# Patient Record
Sex: FEMALE | Race: ASIAN | Employment: UNEMPLOYED | URBAN - METROPOLITAN AREA
[De-identification: names, ages, dates, MRNs, and addresses within clinical notes are randomized per-mention and may not be internally consistent; named-entity substitution may affect disease eponyms.]

---

## 2019-04-11 ENCOUNTER — THERAPY VISIT (OUTPATIENT)
Dept: PHYSICAL THERAPY | Facility: CLINIC | Age: 54
End: 2019-04-11
Payer: COMMERCIAL

## 2019-04-11 DIAGNOSIS — M54.16 LUMBAR RADICULITIS: ICD-10-CM

## 2019-04-11 PROCEDURE — 97110 THERAPEUTIC EXERCISES: CPT | Mod: GP | Performed by: PHYSICAL THERAPIST

## 2019-04-11 PROCEDURE — 97161 PT EVAL LOW COMPLEX 20 MIN: CPT | Mod: GP | Performed by: PHYSICAL THERAPIST

## 2019-04-11 PROCEDURE — 97112 NEUROMUSCULAR REEDUCATION: CPT | Mod: GP | Performed by: PHYSICAL THERAPIST

## 2019-04-11 NOTE — PROGRESS NOTES
[place] [facility] Initial Evaluation  Subjective:            MD: 4/1/2019..                          General health as reported by patient is fair.  Pertinent medical history includes:  Diabetes.      Current medications:  High blood pressure medication.                    Oswestry Score: 10 %                 Objective:  System    Physical Exam    General     ROS    Assessment/Plan:

## 2019-04-11 NOTE — LETTER
DEPARTMENT OF HEALTH AND HUMAN SERVICES  CENTERS FOR MEDICARE & MEDICAID SERVICES    PLAN/UPDATED PLAN OF PROGRESS FOR OUTPATIENT REHABILITATION    PATIENTS NAME:  Porsha Morales   : 1965  PROVIDER NUMBER:    [id number]  [id number]  PROVIDER NAME: [facility]TH[facility]IC [facility] MATEO GALINDO  MEDICAL RECORD NUMBER: [id number]   START OF CARE DATE:  SOC Date: 19   TYPE:  PT  PRIMARY/TREATMENT DIAGNOSIS: (Pertinent Medical Diagnosis)  Lumbar radiculitis  VISITS FROM START OF CARE:  Rxs Used: 1     [facility] Initial Evaluation  Subjective:  The history is provided by the patient. No  was used.   Porsha Morales is a 53 year old female with a lumbar condition.  Condition occurred with:  Insidious onset.  Condition occurred: for unknown reasons.  This is a recurrent condition     Patient reports pain:  Lumbar spine right.  Radiates to:  Gluteals right, thigh right, lower leg right and foot left.  Pain is described as burning and is intermittent and reported as 7/10.  Associated symptoms:  Loss of motion/stiffness and numbness. Pain is worse in the P.M..  Symptoms are exacerbated by bending, sitting and standing (TRANSFER) and relieved by heat.  Since onset symptoms are unchanged.  Special testing: NONE.  Previous treatment: NONE.      Pertinent medical history includes:  Diabetes and high blood pressure.  Medical allergies: no.  Other surgeries include:  Orthopedic surgery (R KNEE, R ANKLE, L FOOT.).  Current medications:  High blood pressure medication.  Current occupation is NOT WORKING..      Barriers include:  None as reported by the patient.  Red flags:  None as reported by the patient.  Objective:  System  Lumbar/SI Evaluation    Lumbar Myotomes:    T12-L3 (Hip Flex):  Left: 5    Right: 5  L2-4 (Quads):  Left:  5-    Right:  5-  L4 (Ankle DF):  Left:  5    Right:  5  L5 (Great Toe Ext): Left: 5    Right: 5   S1 (Toe Raise):  Left: 5    Right:  5  Lumbar DTR's:    L4 (Quad):  Left:  0   Right:  0  S1 (Achilles):  Left:  0   Right:  0  Neural Tension/Mobility:    Right side:   Slump positive.  [facility] Lumbar Evaluation  Posture:  Sitting: fair  Correction of Posture: better    Movement Loss:  Flexion (Flex): pain and min  Extension (EXT): pain and nil  Side Glide R (SG R): nil  Side Glide L (SG L): pain and nil  Test Movements:  FIS: During: peripheralizing and increases    EIS: During: centralizing    Repeat EIS: During: centralizing    Static Tests:  Lying Prone in Extension: DECREASE, CENTRALIZING.   Conclusion: derangement  Principle of Treatment:  Posture Correction: IN SITTING WITH TOWEL ROLL.   Extension: EIS, NANCY.   Other: NEUTRAL SPINE, THER EX, THER ACT, NMR, MANUAL THERAPY.      Assessment/Plan:    Patient is a 53 year old female with lumbar complaints.    Patient has the following significant findings with corresponding treatment plan.                Diagnosis 1:  LUMBAR RADICULITIS  Pain -  hot/cold therapy, US, electric stimulation, manual therapy, splint/taping/bracing/orthotics, self management, education, directional preference exercise and home program  Decreased ROM/flexibility - manual therapy, therapeutic exercise, therapeutic activity and home program  Decreased function - therapeutic activities and home program  Impaired posture - neuro re-education, therapeutic activities and home program    Therapy Evaluation Codes:   1) History comprised of:   Personal factors that impact the plan of care:      Past/current experiences.    Comorbidity factors that impact the plan of care are:      Diabetes.     Medications impacting care: None.  2) Examination of Body Systems comprised of:   Body structures and functions that impact the plan of care:      Lumbar spine.   Activity limitations that impact the plan of care are:      Bathing, Bending, Driving, Dressing, Lifting, Sitting and Transfer. .  3) Clinical presentation characteristics  "are:   Stable/Uncomplicated.  4) Decision-Making    Low complexity using standardized patient assessment instrument and/or measureable assessment of functional outcome.  Cumulative Therapy Evaluation is: Low complexity.  Previous and current functional limitations:  (See Goal Flow Sheet for this information)    Short term and Long term goals: (See Goal Flow Sheet for this information)   Communication ability:  Patient appears to be able to clearly communicate and understand verbal and written communication and follow directions correctly.  Treatment Explanation - The following has been discussed with the patient:   RX ordered/plan of care  Anticipated outcomes  Possible risks and side effects  This patient would benefit from PT intervention to resume normal activities.   Rehab potential is fair.  Frequency:  1 X week, once daily  Duration:  for 6 weeks  Discharge Plan:  Achieve all LTG.  Independent in home treatment program.  Reach maximal therapeutic benefit.  Please refer to the daily flowsheet for treatment today, total treatment time and time spent performing 1:1 timed codes.   [place] [facility] Initial Evaluation  Subjective:          MD: 4/1/2019..    General health as reported by patient is fair.  Pertinent medical history includes:  Diabetes.      Current medications:  High blood pressure medication.        Oswestry Score: 10 %                 Caregiver Signature/Credentials _______________________________________________ Date ________        Slim Ledezma PT/ATC   I have reviewed and certified the need for these services and plan of treatment while under my care.        PHYSICIAN'S SIGNATURE:   ______________________________________________  Date___________     Sandra Yang MD    Certification period:  Beginning of Cert date period: 04/11/19 to  End of Cert period date: 07/09/19   Functional Level Progress Report: Please see attached \"Goal Flow sheet for Functional level.\"  ____X____ " Continue Services or       ________ DC Services              Service dates: From  SOC Date: 04/11/19 date to present

## 2019-04-11 NOTE — LETTER
DEPARTMENT OF HEALTH AND HUMAN SERVICES  CENTERS FOR MEDICARE & MEDICAID SERVICES    PLAN/UPDATED PLAN OF PROGRESS FOR OUTPATIENT REHABILITATION    PATIENTS NAME:  Porsha Morales   : 1965  PROVIDER NUMBER:    [id number]  [id number]    PROVIDER NAME: [facility] ATHLETIC [facility] MATEO GALINDO  MEDICAL RECORD NUMBER: [id number]   START OF CARE DATE:  SOC Date: 19   TYPE:  PT  PRIMARY/TREATMENT DIAGNOSIS: (Pertinent Medical Diagnosis)  Lumbar radiculitis  VISITS FROM START OF CARE:  Rxs Used: 1   [facility] Initial Evaluation  Subjective:  The history is provided by the patient. No  was used.   Porsha Morales is a 53 year old female with a lumbar condition.  Condition occurred with:  Insidious onset.  Condition occurred: for unknown reasons.  This is a recurrent condition  MD: 2019..    Patient reports pain:  Lumbar spine right.  Radiates to:  Gluteals right, thigh right, lower leg right and foot left.  Pain is described as burning and is intermittent and reported as 7/10.  Associated symptoms:  Loss of motion/stiffness and numbness. Pain is worse in the P.M..  Symptoms are exacerbated by bending, sitting and standing (TRANSFER) and relieved by heat.  Since onset symptoms are unchanged.  Special testing: NONE.  Previous treatment: NONE.      Pertinent medical history includes:  Diabetes and high blood pressure.  Medical allergies: no.  Other surgeries include:  Orthopedic surgery (R KNEE, R ANKLE, L FOOT.).  Current medications:  High blood pressure medication.  Current occupation is NOT WORKING..      Barriers include:  None as reported by the patient.  Red flags:  None as reported by the patient.    Objective:  System  Lumbar/SI Evaluation  Lumbar Myotomes:    T12-L3 (Hip Flex):  Left: 5    Right: 5  L2-4 (Quads):  Left:  5-    Right:  5-  L4 (Ankle DF):  Left:  5    Right:  5  L5 (Great Toe Ext): Left: 5    Right: 5   S1 (Toe Raise):  Left: 5     Right: 5  Lumbar DTR's:    L4 (Quad):  Left:  0   Right:  0  S1 (Achilles):  Left:  0   Right:  0  Neural Tension/Mobility:    Right side:   Slump positive.    [facility]ari Lumbar Evaluation    Posture:  Sitting: fair  Correction of Posture: better    Movement Loss:  Flexion (Flex): pain and min  Extension (EXT): pain and nil  Side Glide R (SG R): nil  Side Glide L (SG L): pain and nil  Test Movements:  FIS: During: peripheralizing and increases    EIS: During: centralizing    Repeat EIS: During: centralizing    Static Tests:  Lying Prone in Extension: DECREASE, CENTRALIZING.   Conclusion: derangement  Principle of Treatment:  Posture Correction: IN SITTING WITH TOWEL ROLL.   Extension: EIS, NANCY.   Other: NEUTRAL SPINE, THER EX, THER ACT, NMR, MANUAL THERAPY.                                    Assessment/Plan:    Patient is a 53 year old female with lumbar complaints.    Patient has the following significant findings with corresponding treatment plan.                Diagnosis 1:  LUMBAR RADICULITIS  Pain -  hot/cold therapy, US, electric stimulation, manual therapy, splint/taping/bracing/orthotics, self management, education, directional preference exercise and home program  Decreased ROM/flexibility - manual therapy, therapeutic exercise, therapeutic activity and home program  Decreased function - therapeutic activities and home program  Impaired posture - neuro re-education, therapeutic activities and home program  Therapy Evaluation Codes:   1) History comprised of:   Personal factors that impact the plan of care:      Past/current experiences.    Comorbidity factors that impact the plan of care are:      Diabetes.     Medications impacting care: None.  2) Examination of Body Systems comprised of:   Body structures and functions that impact the plan of care:      Lumbar spine.   Activity limitations that impact the plan of care are:      Bathing, Bending, Driving, Dressing, Lifting, Sitting and Transfer.  ".  3) Clinical presentation characteristics are:   Stable/Uncomplicated.  4) Decision-Making    Low complexity using standardized patient assessment instrument and/or measureable assessment of functional outcome.  Cumulative Therapy Evaluation is: Low complexity.  Previous and current functional limitations:  (See Goal Flow Sheet for this information)    Short term and Long term goals: (See Goal Flow Sheet for this information)   Communication ability:  Patient appears to be able to clearly communicate and understand verbal and written communication and follow directions correctly.  Treatment Explanation - The following has been discussed with the patient:   RX ordered/plan of care  Anticipated outcomes  Possible risks and side effects  This patient would benefit from PT intervention to resume normal activities.   Rehab potential is fair.  Frequency:  1 X week, once daily  Duration:  for 6 weeks  Discharge Plan:  Achieve all LTG.  Independent in home treatment program.  Reach maximal therapeutic benefit.  Please refer to the daily flowsheet for treatment today, total treatment time and time spent performing 1:1 timed codes.   [place] [facility] Initial Evaluation  Subjective:          MD: 4/1/2019..     General health as reported by patient is fair.  Pertinent medical history includes:  Diabetes.      Current medications:  High blood pressure medication.        Oswestry Score: 10 %                 Caregiver Signature/Credentials _____________________________________________ Date ________       Slim Ledezma PT/ATC   I have reviewed and certified the need for these services and plan of treatment while under my care.        PHYSICIAN'S SIGNATURE:   ______________________________________________  Date___________     Sandra Yang MD    Certification period:  Beginning of Cert date period: 04/11/19 to  End of Cert period date: 07/09/19   Functional Level Progress Report: Please see attached \"Goal Flow " "sheet for Functional level.\"  ____X____ Continue Services or       ________ DC Services              Service dates: From  SOC Date: 04/11/19 date to present                       "

## 2019-04-11 NOTE — LETTER
DEPARTMENT OF HEALTH AND HUMAN SERVICES  CENTERS FOR MEDICARE & MEDICAID SERVICES    PLAN/UPDATED PLAN OF PROGRESS FOR OUTPATIENT REHABILITATION    PATIENTS NAME:  Porsha Morales 1965  PROVIDER NUMBER:    [id number]  [id number]  PROVIDER NAME: [place] [facility] MATEO GALINDO  MEDICAL RECORD NUMBER: [id number]   START OF CARE DATE:  SOC Date: 19   TYPE:  PT  PRIMARY/TREATMENT DIAGNOSIS: (Pertinent Medical Diagnosis)  Lumbar radiculitis    VISITS FROM START OF CARE:  Rxs Used: 1     [place] [facility] Initial Evaluation    Subjective:  The history is provided by the patient. No  was used.   Porsha Morales is a 53 year old female with a lumbar condition.  Condition occurred with:  Insidious onset.  Condition occurred: for unknown reasons.  This is a recurrent condition     Patient reports pain:  Lumbar spine right.  Radiates to:  Gluteals right, thigh right, lower leg right and foot left.  Pain is described as burning and is intermittent and reported as 7/10.  Associated symptoms:  Loss of motion/stiffness and numbness. Pain is worse in the P.M..  Symptoms are exacerbated by bending, sitting and standing (TRANSFER) and relieved by heat.  Since onset symptoms are unchanged.  Special testing: NONE.  Previous treatment: NONE.      Pertinent medical history includes:  Diabetes and high blood pressure.  Medical allergies: no.  Other surgeries include:  Orthopedic surgery (R KNEE, R ANKLE, L FOOT.).  Current medications:  High blood pressure medication.  Current occupation is NOT WORKING..      Barriers include:  None as reported by the patient.  Red flags:  None as reported by the patient.  General health as reported by patient is fair.  Pertinent medical history includes:  Diabetes.      Current medications:  High blood pressure medication.        Oswestry Score: 10 %       Lumbar/SI Evaluation  Lumbar Myotomes:    T12-L3 (Hip Flex):  Left: 5     Right: 5  L2-4 (Quads):  Left:  5-    Right:  5-  L4 (Ankle DF):  Left:  5    Right:  5  L5 (Great Toe Ext): Left: 5    Right: 5   S1 (Toe Raise):  Left: 5    Right: 5  Lumbar DTR's:    L4 (Quad):  Left:  0   Right:  0  S1 (Achilles):  Left:  0   Right:  0  Neural Tension/Mobility:    Right side:   Slump positive.        PATIENTS NAME:  Porsha Morales    1965     [place] Lumbar Evaluation  Posture:  Sitting: fair  Correction of Posture: better  Movement Loss:  Flexion (Flex): pain and min  Extension (EXT): pain and nil  Side Glide R (SG R): nil  Side Glide L (SG L): pain and nil  Test Movements:  FIS: During: peripheralizing and increases    EIS: During: centralizing    Repeat EIS: During: centralizing    Static Tests:  Lying Prone in Extension: DECREASE, CENTRALIZING.   Conclusion: derangement  Principle of Treatment:  Posture Correction: IN SITTING WITH TOWEL ROLL.   Extension: EIS, NANCY.   Other: NEUTRAL SPINE, THER EX, THER ACT, NMR, MANUAL THERAPY.   Assessment/Plan:    Patient is a 53 year old female with lumbar complaints.    Patient has the following significant findings with corresponding treatment plan.                Diagnosis 1:  LUMBAR RADICULITIS  Pain -  hot/cold therapy, US, electric stimulation, manual therapy, splint/taping/bracing/orthotics, self management, education, directional preference exercise and home program  Decreased ROM/flexibility - manual therapy, therapeutic exercise, therapeutic activity and home program  Decreased function - therapeutic activities and home program  Impaired posture - neuro re-education, therapeutic activities and home program  Therapy Evaluation Codes:   1) History comprised of:   Personal factors that impact the plan of care:      Past/current experiences.    Comorbidity factors that impact the plan of care are:      Diabetes.     Medications impacting care: None.  2) Examination of Body Systems comprised of:   Body structures and functions that  "impact the plan of care:      Lumbar spine.   Activity limitations that impact the plan of care are:      Bathing, Bending, Driving, Dressing, Lifting, Sitting and Transfer. .  3) Clinical presentation characteristics are:   Stable/Uncomplicated.  4) Decision-Making    Low complexity using standardized patient assessment instrument and/or measureable assessment of functional outcome.  Cumulative Therapy Evaluation is: Low complexity.  Previous and current functional limitations:  (See Goal Flow Sheet for this information)    Short term and Long term goals: (See Goal Flow Sheet for this information)   PATIENTS NAME:  Porsha Morales 1965    Communication ability:  Patient appears to be able to clearly communicate and understand verbal and written communication and follow directions correctly.  Treatment Explanation - The following has been discussed with the patient:   RX ordered/plan of care  Anticipated outcomes  Possible risks and side effects  This patient would benefit from PT intervention to resume normal activities.   Rehab potential is fair.  Frequency:  1 X week, once daily  Duration:  for 6 weeks  Discharge Plan:  Achieve all LTG.  Independent in home treatment program.  Reach maximal therapeutic benefit.                               Caregiver Signature/Credentials _____________________________ Date ________       Treating Provider: Slim Ledezma PT/ATC   I have reviewed and certified the need for these services and plan of treatment while under my care.        PHYSICIAN'S SIGNATURE:   _________________________________________  Date___________   Sandra Yang MD    Certification period:  Beginning of Cert date period: 19 to  End of Cert period date: 19     Functional Level Progress Report: Please see attached \"Goal Flow sheet for Functional level.\"    ____X____ Continue Services or       ________ DC Services                Service dates: From  SOC Date: 19 date to " present

## 2019-04-11 NOTE — PROGRESS NOTES
[place] [facility] Initial Evaluation  Subjective:  The history is provided by the patient. No  was used.   Porsha Morales is a 53 year old female with a lumbar condition.  Condition occurred with:  Insidious onset.  Condition occurred: for unknown reasons.  This is a recurrent condition  MD: 4/1/2019..    Patient reports pain:  Lumbar spine right.  Radiates to:  Gluteals right, thigh right, lower leg right and foot left.  Pain is described as burning and is intermittent and reported as 7/10.  Associated symptoms:  Loss of motion/stiffness and numbness. Pain is worse in the P.M..  Symptoms are exacerbated by bending, sitting and standing (TRANSFER) and relieved by heat.  Since onset symptoms are unchanged.  Special testing: NONE.  Previous treatment: NONE.      Pertinent medical history includes:  Diabetes and high blood pressure.  Medical allergies: no.  Other surgeries include:  Orthopedic surgery (R KNEE, R ANKLE, L FOOT.).  Current medications:  High blood pressure medication.  Current occupation is NOT WORKING..        Barriers include:  None as reported by the patient.    Red flags:  None as reported by the patient.                        Objective:  System         Lumbar/SI Evaluation    Lumbar Myotomes:    T12-L3 (Hip Flex):  Left: 5    Right: 5  L2-4 (Quads):  Left:  5-    Right:  5-  L4 (Ankle DF):  Left:  5    Right:  5  L5 (Great Toe Ext): Left: 5    Right: 5   S1 (Toe Raise):  Left: 5    Right: 5  Lumbar DTR's:    L4 (Quad):  Left:  0   Right:  0  S1 (Achilles):  Left:  0   Right:  0      Neural Tension/Mobility:      Right side:   Slump positive.                                                         [facility] Lumbar Evaluation    Posture:  Sitting: fair        Correction of Posture: better    Movement Loss:  Flexion (Flex): pain and min  Extension (EXT): pain and nil  Side Glide R (SG R): nil  Side Glide L (SG L): pain and nil  Test Movements:  FIS: During:  peripheralizing and increases      EIS: During: centralizing    Repeat EIS: During: centralizing            Static Tests:          Lying Prone in Extension: DECREASE, CENTRALIZING.     Conclusion: derangement  Principle of Treatment:  Posture Correction: IN SITTING WITH TOWEL ROLL.     Extension: EIS, NANCY.     Other: NEUTRAL SPINE, THER EX, THER ACT, NMR, MANUAL THERAPY.                                        ROS    Assessment/Plan:    Patient is a 53 year old female with lumbar complaints.    Patient has the following significant findings with corresponding treatment plan.                Diagnosis 1:  LUMBAR RADICULITIS  Pain -  hot/cold therapy, US, electric stimulation, manual therapy, splint/taping/bracing/orthotics, self management, education, directional preference exercise and home program  Decreased ROM/flexibility - manual therapy, therapeutic exercise, therapeutic activity and home program  Decreased function - therapeutic activities and home program  Impaired posture - neuro re-education, therapeutic activities and home program    Therapy Evaluation Codes:   1) History comprised of:   Personal factors that impact the plan of care:      Past/current experiences.    Comorbidity factors that impact the plan of care are:      Diabetes.     Medications impacting care: None.  2) Examination of Body Systems comprised of:   Body structures and functions that impact the plan of care:      Lumbar spine.   Activity limitations that impact the plan of care are:      Bathing, Bending, Driving, Dressing, Lifting, Sitting and Transfer. .  3) Clinical presentation characteristics are:   Stable/Uncomplicated.  4) Decision-Making    Low complexity using standardized patient assessment instrument and/or measureable assessment of functional outcome.  Cumulative Therapy Evaluation is: Low complexity.    Previous and current functional limitations:  (See Goal Flow Sheet for this information)    Short term and Long term goals:  (See Goal Flow Sheet for this information)     Communication ability:  Patient appears to be able to clearly communicate and understand verbal and written communication and follow directions correctly.  Treatment Explanation - The following has been discussed with the patient:   RX ordered/plan of care  Anticipated outcomes  Possible risks and side effects  This patient would benefit from PT intervention to resume normal activities.   Rehab potential is fair.    Frequency:  1 X week, once daily  Duration:  for 6 weeks  Discharge Plan:  Achieve all LTG.  Independent in home treatment program.  Reach maximal therapeutic benefit.      Please refer to the daily flowsheet for treatment today, total treatment time and time spent performing 1:1 timed codes.

## 2019-12-03 PROBLEM — M54.16 LUMBAR RADICULITIS: Status: RESOLVED | Noted: 2019-04-11 | Resolved: 2019-12-03
